# Patient Record
Sex: MALE | Race: WHITE | NOT HISPANIC OR LATINO | Employment: FULL TIME | URBAN - METROPOLITAN AREA
[De-identification: names, ages, dates, MRNs, and addresses within clinical notes are randomized per-mention and may not be internally consistent; named-entity substitution may affect disease eponyms.]

---

## 2017-06-13 ENCOUNTER — HOSPITAL ENCOUNTER (EMERGENCY)
Facility: HOSPITAL | Age: 32
Discharge: HOME | End: 2017-06-13
Attending: EMERGENCY MEDICINE

## 2017-06-13 VITALS
RESPIRATION RATE: 20 BRPM | DIASTOLIC BLOOD PRESSURE: 70 MMHG | HEIGHT: 68 IN | OXYGEN SATURATION: 97 % | HEART RATE: 84 BPM | SYSTOLIC BLOOD PRESSURE: 123 MMHG | BODY MASS INDEX: 24.25 KG/M2 | WEIGHT: 160 LBS | TEMPERATURE: 99 F

## 2017-06-13 DIAGNOSIS — S29.019A THORACIC MYOFASCIAL STRAIN, INITIAL ENCOUNTER: Primary | ICD-10-CM

## 2017-06-13 PROCEDURE — 96372 THER/PROPH/DIAG INJ SC/IM: CPT

## 2017-06-13 PROCEDURE — 63600175 PHARM REV CODE 636 W HCPCS: Performed by: PHYSICIAN ASSISTANT

## 2017-06-13 PROCEDURE — 99283 EMERGENCY DEPT VISIT LOW MDM: CPT | Mod: 25

## 2017-06-13 RX ORDER — KETOROLAC TROMETHAMINE 30 MG/ML
30 INJECTION, SOLUTION INTRAMUSCULAR; INTRAVENOUS
Status: COMPLETED | OUTPATIENT
Start: 2017-06-13 | End: 2017-06-13

## 2017-06-13 RX ORDER — ORPHENADRINE CITRATE 30 MG/ML
60 INJECTION INTRAMUSCULAR; INTRAVENOUS
Status: COMPLETED | OUTPATIENT
Start: 2017-06-13 | End: 2017-06-13

## 2017-06-13 RX ORDER — CYCLOBENZAPRINE HCL 10 MG
10 TABLET ORAL 3 TIMES DAILY PRN
Qty: 15 TABLET | Refills: 0 | Status: SHIPPED | OUTPATIENT
Start: 2017-06-13 | End: 2017-06-18

## 2017-06-13 RX ADMIN — ORPHENADRINE CITRATE 60 MG: 30 INJECTION INTRAMUSCULAR; INTRAVENOUS at 01:06

## 2017-06-13 RX ADMIN — KETOROLAC TROMETHAMINE 30 MG: 30 INJECTION, SOLUTION INTRAMUSCULAR at 01:06

## 2017-06-13 NOTE — ED PROVIDER NOTES
Encounter Date: 6/13/2017    SCRIBE #1 NOTE: ICamille, am scribing for, and in the presence of, Regina Johnston PA-C.       History     Chief Complaint   Patient presents with    Back Pain     Onset of low back pain while loading transmission in back of truck.  Reports swelling to right flank.  No loss of bowel or bladder control.  Ambulatory.  Denies numbness or tingling to BLE.      Review of patient's allergies indicates:  No Known Allergies  06/13/2017  1:38 PM     Chief Complaint:  Back Pain      Syed Lechuga is a 31 y.o. male with no pmhx on file presenting to the E.D. with an acute onset of moderate constant mid to lower back pain which began yesterday following lifting a transmission of a vehicle. He denies direct injury or trauma. Pain is exacerbated with movement and he has not taken medication for pain control. No numbness or weakness. Pt has no past surgical history on file.        The history is provided by the patient.     History reviewed. No pertinent past medical history.  No past surgical history on file.  No family history on file.  Social History   Substance Use Topics    Smoking status: Not on file    Smokeless tobacco: Not on file    Alcohol use Not on file     Review of Systems   Constitutional: Negative for fever.   HENT: Negative.  Negative for sore throat.    Eyes: Negative for visual disturbance.   Respiratory: Negative for cough.    Cardiovascular: Negative for chest pain.   Gastrointestinal: Negative for abdominal pain, diarrhea, nausea and vomiting.   Genitourinary: Negative for difficulty urinating.   Musculoskeletal: Positive for back pain. Negative for arthralgias.   Skin: Negative for rash.   Neurological: Negative for weakness.       Physical Exam     Initial Vitals [06/13/17 1230]   BP Pulse Resp Temp SpO2   123/70 84 20 98.6 °F (37 °C) 97 %     Physical Exam    Nursing note and vitals reviewed.  Constitutional: He appears well-developed and well-nourished.   HENT:    Head: Normocephalic and atraumatic.   Eyes: Conjunctivae are normal.   Neck: Neck supple.   Cardiovascular: Normal rate, regular rhythm, normal heart sounds and intact distal pulses. Exam reveals no gallop and no friction rub.    No murmur heard.  Pulmonary/Chest: Breath sounds normal. He has no wheezes. He has no rhonchi. He has no rales.   Abdominal: Soft. He exhibits no distension. There is no tenderness.   Musculoskeletal: Normal range of motion. He exhibits tenderness.        Thoracic back: He exhibits tenderness.        Lumbar back: He exhibits tenderness.   Spasm and TTP of his right lower thoracic and upper lumbar paraspinal muscles.  No decreased range of motion, decreased strength or loss of sensation to bilateral upper or lower extremities.  Palpable 2+ radial and pedal pulses.   Neurological: He is alert and oriented to person, place, and time. He has normal strength. No sensory deficit.   Skin: Skin is warm and dry. No rash and no abscess noted. No erythema.   Psychiatric: He has a normal mood and affect.         ED Course   Procedures  Labs Reviewed - No data to display          Medical Decision Making:   Differential Diagnosis:   Spasm  Strain  Fracture  Cauda equina       APC / Resident Notes:   His symptoms are most consistent with a back strain and associated spasm.  Low suspicion for acute fracture, cauda equina or abscess we do not feel any further imaging or testing is necessary at this time.  He is given a dose of IM Toradol and Norflex here in the emergency department.  He is discharged home to follow-up with his primary care provider for reevaluation in the next couple of days as needed.  He voices understanding and is agreeable to the plan.  He is given specific return precautions.       Scribe Attestation:   Scribe #1: I performed the above scribed service and the documentation accurately describes the services I performed. I attest to the accuracy of the note.    Attending Attestation:      Physician Attestation Statement for NP/PA:   I have conducted a face to face encounter with this patient in addition to the NP/PA, due to Medical Complexity    Other NP/PA Attestation Additions:      Medical Decision Making: I provided a face to face evaluation of this patient.  I discussed the patient's care with Advanced Practice Clinician.  I reviewed their note and agree with the history, physical, assessment, diagnosis, treatment, and discharge plan provided by the Advanced Practice Clinician. My overall impression is thoracic muscle strain.  The patient has been instructed to follow up with their physician or the one provided as well as specific return precautions.          Physician Attestation for Scribe:  Physician Attestation Statement for Scribe #1: I, Regina Johnston PA-C, reviewed documentation, as scribed by Camille Barker in my presence, and it is both accurate and complete.                 ED Course     Clinical Impression:   The encounter diagnosis was Thoracic myofascial strain, initial encounter.               Regina Johnston PA-C  06/13/17 [id number]       Glenn Gibbs MD  06/14/17 1246

## 2017-06-13 NOTE — ED NOTES
"C/o lower back pain after lifting a transmission "it feels like I pulled my back out" alert calm able to ambulate well. Sensation, pulses and bowel and bladder intact. Family at bedside aware to notify nurse of needs or concerns.   "